# Patient Record
Sex: FEMALE | Race: WHITE | Employment: FULL TIME | URBAN - NONMETROPOLITAN AREA
[De-identification: names, ages, dates, MRNs, and addresses within clinical notes are randomized per-mention and may not be internally consistent; named-entity substitution may affect disease eponyms.]

---

## 2024-04-04 ENCOUNTER — HOSPITAL ENCOUNTER (EMERGENCY)
Age: 19
Discharge: HOME | End: 2024-04-04
Payer: COMMERCIAL

## 2024-04-04 VITALS
DIASTOLIC BLOOD PRESSURE: 86 MMHG | HEIGHT: 64 IN | SYSTOLIC BLOOD PRESSURE: 142 MMHG | TEMPERATURE: 98.5 F | RESPIRATION RATE: 16 BRPM | BODY MASS INDEX: 32.44 KG/M2 | WEIGHT: 190 LBS | OXYGEN SATURATION: 97 % | HEART RATE: 86 BPM

## 2024-04-04 DIAGNOSIS — H11.31 SUBCONJUNCTIVAL HEMORRHAGE OF RIGHT EYE: Primary | ICD-10-CM

## 2024-04-04 PROCEDURE — 99203 OFFICE O/P NEW LOW 30 MIN: CPT

## 2024-04-04 PROCEDURE — 99213 OFFICE O/P EST LOW 20 MIN: CPT

## 2024-04-04 RX ORDER — ETONOGESTREL 68 MG/1
68 IMPLANT SUBCUTANEOUS ONCE
COMMUNITY
Start: 2021-10-19

## 2024-04-04 ASSESSMENT — ENCOUNTER SYMPTOMS
PHOTOPHOBIA: 0
EYE REDNESS: 1
EYE DISCHARGE: 0

## 2024-04-04 ASSESSMENT — PAIN DESCRIPTION - DESCRIPTORS: DESCRIPTORS: ACHING

## 2024-04-04 ASSESSMENT — PAIN SCALES - GENERAL: PAINLEVEL_OUTOF10: 5

## 2024-04-04 ASSESSMENT — PAIN DESCRIPTION - ORIENTATION: ORIENTATION: RIGHT

## 2024-04-04 ASSESSMENT — PAIN - FUNCTIONAL ASSESSMENT: PAIN_FUNCTIONAL_ASSESSMENT: 0-10

## 2024-04-04 ASSESSMENT — PAIN DESCRIPTION - LOCATION: LOCATION: EYE

## 2024-04-04 ASSESSMENT — VISUAL ACUITY
OU: 20/13
OD: 20/25
OS: 20/25

## 2024-04-04 ASSESSMENT — PAIN DESCRIPTION - PAIN TYPE: TYPE: ACUTE PAIN

## 2024-04-04 NOTE — DISCHARGE INSTRUCTIONS
If you begin developing changes in vision, worsening pain, or worsening symptoms, go directly to the emergency room.     Tylenol / Ibuprofen as needed for pain.  Follow up with PCP in 3-5 days if no improvement
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
## 2024-04-04 NOTE — ED PROVIDER NOTES
discharge.      Extraocular Movements:      Right eye: No nystagmus.      Conjunctiva/sclera:      Right eye: Hemorrhage present.     Cardiovascular:      Rate and Rhythm: Normal rate and regular rhythm.   Pulmonary:      Effort: Pulmonary effort is normal. No respiratory distress.      Breath sounds: Normal breath sounds. No stridor. No wheezing or rhonchi.   Skin:     General: Skin is warm.      Capillary Refill: Capillary refill takes less than 2 seconds.   Neurological:      General: No focal deficit present.      Mental Status: She is oriented to person, place, and time.         DIAGNOSTIC RESULTS   Labs:No results found for this visit on 04/04/24.    IMAGING:  No orders to display      URGENT CARE COURSE:     Vitals:    04/04/24 1933   BP: (!) 142/86   Pulse: 86   Resp: 16   Temp: 98.5 °F (36.9 °C)   TempSrc: Temporal   SpO2: 97%   Weight: 86.2 kg (190 lb)   Height: 1.626 m (5' 4\")       Medications - No data to display  PROCEDURES:  None  FINAL IMPRESSION      1. Subconjunctival hemorrhage of right eye        DISPOSITION/PLAN   DISPOSITION Decision To Discharge 04/04/2024 07:41:55 PM    Patient will acuity is within normal limits.  Upon physical exam right eye redness is consistent with subconjunctival hemorrhage due to trauma.  Discussed with patient generally this does heal on its own over 1 to 2 weeks.  Discussed with patient she may apply compresses to area for comfort and take Tylenol and ibuprofen.  Discussed with patient if symptoms do not appear to be improving over the next few days, or if she feels as if she is developing worsening symptoms, blurred vision, dizziness to go directly to the emergency room.  Discussed with patient she may follow-up with her family eye provider for reassessment in 3 to 5 days.  Patient in agreement with this plan.      PATIENT REFERRED TO:  [facility] EMERGENCY DEPT  [address] [place] [address]  [phone]  Go to   Go directly to [facility], If symptoms

## 2024-05-22 ENCOUNTER — OFFICE VISIT (OUTPATIENT)
Dept: FAMILY MEDICINE CLINIC | Age: 19
End: 2024-05-22
Payer: COMMERCIAL

## 2024-05-22 VITALS
HEIGHT: 65 IN | DIASTOLIC BLOOD PRESSURE: 68 MMHG | WEIGHT: 209 LBS | RESPIRATION RATE: 12 BRPM | SYSTOLIC BLOOD PRESSURE: 112 MMHG | HEART RATE: 64 BPM | BODY MASS INDEX: 34.82 KG/M2

## 2024-05-22 DIAGNOSIS — L23.7 POISON IVY DERMATITIS: Primary | ICD-10-CM

## 2024-05-22 PROBLEM — J45.990 EXERCISE INDUCED BRONCHOSPASM: Status: ACTIVE | Noted: 2018-03-14

## 2024-05-22 PROBLEM — J30.2 SEASONAL ALLERGIC RHINITIS: Status: ACTIVE | Noted: 2018-07-25

## 2024-05-22 PROCEDURE — 99203 OFFICE O/P NEW LOW 30 MIN: CPT | Performed by: NURSE PRACTITIONER

## 2024-05-22 RX ORDER — HYDROXYZINE HYDROCHLORIDE 25 MG/1
25 TABLET, FILM COATED ORAL EVERY 6 HOURS PRN
Qty: 30 TABLET | Refills: 0 | Status: SHIPPED | OUTPATIENT
Start: 2024-05-22

## 2024-05-22 RX ORDER — PREDNISONE 20 MG/1
TABLET ORAL
Qty: 36 TABLET | Refills: 0 | Status: SHIPPED | OUTPATIENT
Start: 2024-05-22 | End: 2024-06-08

## 2024-05-22 SDOH — ECONOMIC STABILITY: HOUSING INSECURITY
IN THE LAST 12 MONTHS, WAS THERE A TIME WHEN YOU DID NOT HAVE A STEADY PLACE TO SLEEP OR SLEPT IN A SHELTER (INCLUDING NOW)?: NO

## 2024-05-22 SDOH — ECONOMIC STABILITY: INCOME INSECURITY: HOW HARD IS IT FOR YOU TO PAY FOR THE VERY BASICS LIKE FOOD, HOUSING, MEDICAL CARE, AND HEATING?: NOT HARD AT ALL

## 2024-05-22 SDOH — ECONOMIC STABILITY: FOOD INSECURITY: WITHIN THE PAST 12 MONTHS, YOU WORRIED THAT YOUR FOOD WOULD RUN OUT BEFORE YOU GOT MONEY TO BUY MORE.: NEVER TRUE

## 2024-05-22 SDOH — ECONOMIC STABILITY: FOOD INSECURITY: WITHIN THE PAST 12 MONTHS, THE FOOD YOU BOUGHT JUST DIDN'T LAST AND YOU DIDN'T HAVE MONEY TO GET MORE.: NEVER TRUE

## 2024-05-22 ASSESSMENT — PATIENT HEALTH QUESTIONNAIRE - PHQ9
1. LITTLE INTEREST OR PLEASURE IN DOING THINGS: NOT AT ALL
SUM OF ALL RESPONSES TO PHQ QUESTIONS 1-9: 0
SUM OF ALL RESPONSES TO PHQ9 QUESTIONS 1 & 2: 0
2. FEELING DOWN, DEPRESSED OR HOPELESS: NOT AT ALL

## 2024-05-22 ASSESSMENT — ENCOUNTER SYMPTOMS
SHORTNESS OF BREATH: 0
DIARRHEA: 0

## 2024-05-22 NOTE — PROGRESS NOTES
[facility]ESSION[facility]  [address] [facility] [address]  Dept: [phone]  Dept Fax: [phone]  Loc: [phone]     Visit Date:  5/22/2024    Patient:  Georgina Dalal  YOB: 2005  Age: 18 y.o.  Gender: female  BMI: Body mass index is 34.78 kg/m².    Georgina Dalal, Established patient, is being seen today for   Chief Complaint   Patient presents with    Established New Doctor     Rash on back   .     Assessment/Plan      1. Poison ivy dermatitis  - Acute  - Symptoms consistent with poison ivy dermatitis  - Start oral steroid and hydroxyzine  - hydrOXYzine HCl (ATARAX) 25 MG tablet; Take 1 tablet by mouth every 6 hours as needed for Itching  Dispense: 30 tablet; Refill: 0  - predniSONE (DELTASONE) 20 MG tablet; Take 3 tablets by mouth daily for 6 days, THEN 2 tablets daily for 6 days, THEN 1 tablet daily for 6 days.  Dispense: 36 tablet; Refill: 0      Return in about 1 year (around 5/22/2025) for Annual Well Visit.    HPI:     Patient presents today for a new patient appointment. Former patient of Dr. Croft in [place]. Health maintenance reviewed. Medical history significant for allergic rhinitis. Current specialists include her obgyn at [facility]. Current concerns include a rash. First noted after going mushroom hunting.       Rash  This is a new problem. The current episode started 1 to 4 weeks ago. The problem has been gradually worsening since onset. The affected locations include the back, torso, left arm and right arm. The rash is characterized by redness and itchiness. Pertinent negatives include no anorexia, diarrhea, joint pain or shortness of breath. Past treatments include antihistamine. The treatment provided mild relief. Her past medical history is significant for allergies.         5/22/2024     3:22 PM   PHQ Scores   PHQ2 Score 0   PHQ9 Score 0     Interpretation of Total Score Depression

## 2025-07-15 ENCOUNTER — HOSPITAL ENCOUNTER (EMERGENCY)
Age: 20
Discharge: HOME | End: 2025-07-15
Payer: COMMERCIAL

## 2025-07-15 VITALS
HEART RATE: 80 BPM | DIASTOLIC BLOOD PRESSURE: 64 MMHG | OXYGEN SATURATION: 99 % | RESPIRATION RATE: 14 BRPM | SYSTOLIC BLOOD PRESSURE: 135 MMHG | TEMPERATURE: 98.4 F

## 2025-07-15 DIAGNOSIS — R21 RASH AND OTHER NONSPECIFIC SKIN ERUPTION: Primary | ICD-10-CM

## 2025-07-15 PROCEDURE — 99213 OFFICE O/P EST LOW 20 MIN: CPT

## 2025-07-15 RX ORDER — PREDNISONE 20 MG/1
TABLET ORAL
Qty: 15 TABLET | Refills: 0 | Status: SHIPPED | OUTPATIENT
Start: 2025-07-15 | End: 2025-07-25

## 2025-07-15 RX ORDER — HYDROCORTISONE 25 MG/G
CREAM TOPICAL 2 TIMES DAILY
COMMUNITY

## 2025-07-15 ASSESSMENT — ENCOUNTER SYMPTOMS
RHINORRHEA: 0
EYE PAIN: 0
CONSTIPATION: 0
PHOTOPHOBIA: 0
CHEST TIGHTNESS: 0
DIARRHEA: 0
SINUS PAIN: 0
ABDOMINAL PAIN: 0
BACK PAIN: 0
SORE THROAT: 0
VOMITING: 0
WHEEZING: 0
COUGH: 0
APNEA: 0
SHORTNESS OF BREATH: 0
NAUSEA: 0
TROUBLE SWALLOWING: 0

## 2025-07-15 ASSESSMENT — PAIN DESCRIPTION - DESCRIPTORS: DESCRIPTORS: DISCOMFORT;ITCHING

## 2025-07-15 ASSESSMENT — PAIN DESCRIPTION - PAIN TYPE: TYPE: ACUTE PAIN

## 2025-07-15 ASSESSMENT — PAIN - FUNCTIONAL ASSESSMENT
PAIN_FUNCTIONAL_ASSESSMENT: 0-10
PAIN_FUNCTIONAL_ASSESSMENT: ACTIVITIES ARE NOT PREVENTED

## 2025-07-15 ASSESSMENT — PAIN DESCRIPTION - ORIENTATION: ORIENTATION: UPPER;POSTERIOR;RIGHT

## 2025-07-15 ASSESSMENT — PAIN DESCRIPTION - LOCATION: LOCATION: LEG

## 2025-07-15 ASSESSMENT — PAIN DESCRIPTION - FREQUENCY: FREQUENCY: INTERMITTENT

## 2025-07-15 ASSESSMENT — PAIN DESCRIPTION - ONSET: ONSET: PROGRESSIVE

## 2025-07-15 ASSESSMENT — PAIN SCALES - GENERAL: PAINLEVEL_OUTOF10: 10

## 2025-07-15 NOTE — ED PROVIDER NOTES
[facility] EMERGENCY DEPARTMENT  Urgent Care Encounter       CHIEF COMPLAINT       Chief Complaint   Patient presents with    Rash     Right posterior thigh       Nurses Notes reviewed and I agree except as noted in the HPI.  HISTORY OF PRESENT ILLNESS   Georgina Dalal is a 19 y.o. female who presents to [facility] for evaluation of rash to the right posterior thigh.  Reports that symptoms started and she is unsure of what caused it.  Denies any known exposure to similar rash.  Denies any changes in lotions, other cosmetics, or laundry detergent.     The history is provided by the patient.       REVIEW OF SYSTEMS     Review of Systems   Constitutional:  Negative for activity change, appetite change, chills, fatigue, fever and unexpected weight change.   HENT:  Negative for congestion, ear pain, hearing loss, mouth sores, nosebleeds, rhinorrhea, sinus pain, sneezing, sore throat, tinnitus and trouble swallowing.    Eyes:  Negative for photophobia, pain and visual disturbance.   Respiratory:  Negative for apnea, cough, chest tightness, shortness of breath and wheezing.    Cardiovascular:  Negative for chest pain and palpitations.   Gastrointestinal:  Negative for abdominal pain, constipation, diarrhea, nausea and vomiting.   Endocrine: Negative for cold intolerance, heat intolerance, polydipsia, polyphagia and polyuria.   Genitourinary:  Negative for difficulty urinating, dysuria, flank pain, frequency, hematuria, menstrual problem and urgency.   Musculoskeletal:  Negative for arthralgias, back pain, gait problem, joint swelling, neck pain and neck stiffness.   Skin:  Positive for rash. Negative for wound.   Neurological:  Negative for dizziness, tremors, seizures, speech difficulty, weakness, numbness and headaches.   Psychiatric/Behavioral:  Negative for behavioral problems and suicidal ideas. The patient is not nervous/anxious.    All other systems reviewed and are negative.      PAST MEDICAL

## 2025-07-15 NOTE — DISCHARGE INSTRUCTIONS
I sent in prednisone for you.  This is the steroid.    You can use an antihistamine as well.  However this might make you feel little extra thirsty.    Push water intake.    Avoid any harsh detergents or soaps as this can make the irritation worse.    I am not sure what is causing this rash.  However if it goes away and then returns I would see if he can find any common denominator's that is contributing.

## 2025-07-29 ENCOUNTER — HOSPITAL ENCOUNTER (EMERGENCY)
Age: 20
Discharge: HOME | End: 2025-07-29
Payer: COMMERCIAL

## 2025-07-29 VITALS
HEART RATE: 88 BPM | SYSTOLIC BLOOD PRESSURE: 124 MMHG | TEMPERATURE: 98.3 F | RESPIRATION RATE: 14 BRPM | DIASTOLIC BLOOD PRESSURE: 74 MMHG | OXYGEN SATURATION: 98 %

## 2025-07-29 DIAGNOSIS — L25.9 CONTACT DERMATITIS, UNSPECIFIED CONTACT DERMATITIS TYPE, UNSPECIFIED TRIGGER: Primary | ICD-10-CM

## 2025-07-29 DIAGNOSIS — R21 RASH AND OTHER NONSPECIFIC SKIN ERUPTION: ICD-10-CM

## 2025-07-29 PROCEDURE — 99213 OFFICE O/P EST LOW 20 MIN: CPT

## 2025-07-29 PROCEDURE — 6360000002 HC RX W HCPCS

## 2025-07-29 PROCEDURE — 96372 THER/PROPH/DIAG INJ SC/IM: CPT

## 2025-07-29 RX ORDER — HYDROCORTISONE 25 MG/G
CREAM TOPICAL
Qty: 20 G | Refills: 0 | Status: SHIPPED | OUTPATIENT
Start: 2025-07-29

## 2025-07-29 RX ORDER — CETIRIZINE HYDROCHLORIDE 10 MG/1
10 TABLET ORAL DAILY
Qty: 30 TABLET | Refills: 0 | Status: SHIPPED | OUTPATIENT
Start: 2025-07-29

## 2025-07-29 RX ORDER — HYDROXYZINE HYDROCHLORIDE 25 MG/1
25 TABLET, FILM COATED ORAL EVERY 8 HOURS PRN
Qty: 30 TABLET | Refills: 0 | Status: SHIPPED | OUTPATIENT
Start: 2025-07-29 | End: 2025-08-08

## 2025-07-29 RX ORDER — METHYLPREDNISOLONE ACETATE 80 MG/ML
80 INJECTION, SUSPENSION INTRA-ARTICULAR; INTRALESIONAL; INTRAMUSCULAR; SOFT TISSUE ONCE
Status: COMPLETED | OUTPATIENT
Start: 2025-07-29 | End: 2025-07-29

## 2025-07-29 RX ADMIN — METHYLPREDNISOLONE ACETATE 80 MG: 80 INJECTION, SUSPENSION INTRA-ARTICULAR; INTRALESIONAL; INTRAMUSCULAR; SOFT TISSUE at 12:56

## 2025-07-29 ASSESSMENT — PAIN DESCRIPTION - PAIN TYPE: TYPE: ACUTE PAIN

## 2025-07-29 ASSESSMENT — PAIN - FUNCTIONAL ASSESSMENT
PAIN_FUNCTIONAL_ASSESSMENT: ACTIVITIES ARE NOT PREVENTED
PAIN_FUNCTIONAL_ASSESSMENT: 0-10

## 2025-07-29 ASSESSMENT — PAIN DESCRIPTION - ORIENTATION: ORIENTATION: UPPER

## 2025-07-29 ASSESSMENT — PAIN DESCRIPTION - LOCATION: LOCATION: GENERALIZED

## 2025-07-29 ASSESSMENT — PAIN DESCRIPTION - ONSET: ONSET: PROGRESSIVE

## 2025-07-29 ASSESSMENT — PAIN DESCRIPTION - FREQUENCY: FREQUENCY: INTERMITTENT

## 2025-07-29 ASSESSMENT — PAIN DESCRIPTION - DESCRIPTORS: DESCRIPTORS: ITCHING

## 2025-07-29 ASSESSMENT — PAIN SCALES - GENERAL: PAINLEVEL_OUTOF10: 4

## 2025-07-29 NOTE — DISCHARGE INSTRUCTIONS
Avoid the sun when on steroids  Avoid cream on face  Hydroxyzine as needed for itching  Zyrtec daily  Follow up with PCP  I believe you are having a reaction to something. Try to figure out what it could be.

## 2025-07-29 NOTE — ED NOTES
No adverse medication reaction noted with meds given.     Akilah Bang, VAMSHI  07/29/25 [id number]       Akilah Bang RN  07/29/25 [id number]

## 2025-07-29 NOTE — ED PROVIDER NOTES
[facility] EMERGENCY DEPARTMENT  Urgent Care Encounter       CHIEF COMPLAINT       Chief Complaint   Patient presents with    Rash     Started on her face and now it was spread to neck and chest and shoulders /upper arms        Nurses Notes reviewed and I agree except as noted in the HPI.  HISTORY OF PRESENT ILLNESS   Georgina Dalal is a 19 y.o. female who presents with complaints of rash. Pt reports that she began having itching on cheeks last night but rash did not show up til today. Pt reports she was seen a few weeks ago and was told it was an allergic reaction to something and was told to try to change products to sensitive. Pt reports that since then she has not changed products. Pt reports that she was on steroids for 10 days and stopped them on Thursday. Pt denies chest pain, shortness of breath, or difficulty swallowing. Pt denies anyone else with symptoms. Pt denies cough, congestion, runny nose, or fevers.     The history is provided by the patient.       REVIEW OF SYSTEMS     Review of Systems   Skin:  Positive for rash.   All other systems reviewed and are negative.      PAST MEDICAL HISTORY   No past medical history on file.    SURGICALHISTORY     Patient  has no past surgical history on file.    CURRENT MEDICATIONS       Previous Medications    No medications on file       ALLERGIES     Patient is has no known allergies.    Patients   Immunization History   Administered Date(s) Administered    COVID-19, PFIZER PURPLE top, DILUTE for use, (age 12 y+), 30mcg/0.3mL 11/19/2021, 12/10/2021    DTaP, INFANRIX, (age 6w-6y), IM, 0.5mL 03/03/2005, 08/01/2005, 12/19/2005, 03/22/2006, 04/07/2010    HPV Quadrivalent (Gardasil) 08/19/2016, 11/11/2016, 02/28/2017    Hep A, HAVRIX, VAQTA, (age 12m-18y), IM, 0.5mL 04/22/2015, 02/28/2017    Hib vaccine 03/03/2005, 08/01/2005, 12/19/2005, 03/22/2006    Influenza, FLUCELVAX, (age 6 mo+), MDCK, Quadv PF, 0.5mL 10/05/2023    Meningococcal ACWY,